# Patient Record
Sex: FEMALE | Race: OTHER | HISPANIC OR LATINO | Employment: STUDENT | URBAN - METROPOLITAN AREA
[De-identification: names, ages, dates, MRNs, and addresses within clinical notes are randomized per-mention and may not be internally consistent; named-entity substitution may affect disease eponyms.]

---

## 2023-09-21 ENCOUNTER — TELEPHONE (OUTPATIENT)
Dept: PSYCHIATRY | Facility: CLINIC | Age: 18
End: 2023-09-21
Payer: OTHER GOVERNMENT

## 2023-09-21 NOTE — TELEPHONE ENCOUNTER
----- Message from Zohra Mondragon sent at 9/21/2023 10:59 AM CDT -----  States she would like to schedule an appt for today or tomorrow. Please call Kim Garcia [phone]. Thank you